# Patient Record
Sex: MALE | Race: WHITE | URBAN - METROPOLITAN AREA
[De-identification: names, ages, dates, MRNs, and addresses within clinical notes are randomized per-mention and may not be internally consistent; named-entity substitution may affect disease eponyms.]

---

## 2023-07-19 ENCOUNTER — EXTERNAL FACILITY (OUTPATIENT)
Dept: PULMONOLOGY | Facility: CLINIC | Age: 83
End: 2023-07-19

## 2023-07-19 DIAGNOSIS — R04.0 EPISTAXIS: ICD-10-CM

## 2023-07-19 DIAGNOSIS — J44.9 CHRONIC OBSTRUCTIVE PULMONARY DISEASE, UNSPECIFIED COPD TYPE (HCC): ICD-10-CM

## 2023-07-19 DIAGNOSIS — J96.11 CHRONIC HYPOXEMIC RESPIRATORY FAILURE (HCC): Primary | ICD-10-CM

## 2023-07-19 PROCEDURE — 99309 SBSQ NF CARE MODERATE MDM 30: CPT | Performed by: PHYSICIAN ASSISTANT

## 2023-07-19 NOTE — PROGRESS NOTES
Pulmonary Progress Note  [facility] [address]    HPI: Pt is an [de-identified] yo male who I am seeing for follow-up of respiratory failure and COPD. Seen and examined while resting in bed. C/o shortness of breath and chest tightness. He is asking for nebulizer treatment. Oxygen saturations 94% on 2 L O2. He had nose bleed overnight. He denies wheezing, chest pain, and cough. He denies fever and chills. It is of note that patient is confused at baseline. ROS: Unable to obtain. Patient is confused. PE:  /70, HR 77, RR 16, T 97.2, sat 94% on 2 L O2  General: Awake, alert, NAD, O2 via NC  HEENT: NC/AT, MMM, dried blood to bilateral nares  Cardio: RRR, S1S2, no murmur  Lungs: Normal effort, diminished BS b/l, expiratory rales  Abd: Soft, nontender, nondistended  Ext: No edema  Skin: Warm, dry  Neuro: A&Ox2 to person and year, moves all extremities  Psych Calm, cooperative, confused    A/P:  1. Chronic hypoxemic respiratory failure due to COPD with extensive tobacco history - oxygen saturations stable. He complains of worsening shortness of breath and has expiratory rales on exam. Will add scheduled nebs. Hold on steroids for now. Plan:  -O2 2-3 L  -Continue ICS/LABA (fluticasone-salmeterol)  -Add DuoNebs BID and q6 hours prn    2. Epistaxis - resolved. Plan:  -Add saline nasal spray  -Stop Flonase for now    3.  DVT prophylaxis    Plan:  -Dona Clayton PA-C  Pulmonary Medicine

## 2023-07-26 ENCOUNTER — EXTERNAL FACILITY (OUTPATIENT)
Dept: PULMONOLOGY | Facility: CLINIC | Age: 83
End: 2023-07-26

## 2023-07-26 DIAGNOSIS — J44.9 CHRONIC OBSTRUCTIVE PULMONARY DISEASE, UNSPECIFIED COPD TYPE (HCC): ICD-10-CM

## 2023-07-26 DIAGNOSIS — J96.11 CHRONIC HYPOXEMIC RESPIRATORY FAILURE (HCC): Primary | ICD-10-CM

## 2023-07-26 PROCEDURE — 99308 SBSQ NF CARE LOW MDM 20: CPT | Performed by: PHYSICIAN ASSISTANT

## 2023-07-28 NOTE — PROGRESS NOTES
Pulmonary Progress Note  [facility] [address]     HPI: Pt is an [de-identified] yo male who I am seeing for follow-up of respiratory failure and COPD. Seen and examined while resting in bed. He admits to chronic dyspnea which is stable. No chest tightness, wheezing, cough, or chest pain. \Patient is poor historian. ROS: Unable to obtain. Patient is confused. PE:  /66, HR 97, sat 96% on 2 L O2  General: Awake, alert, NAD, O2 via NC  HEENT: NC/AT, MMM  Cardio: RRR, S1S2, no murmur  Lungs: Normal effort, diminished BS b/l but otherwise CTAB  Ext: No edema  Skin: Warm, dry  Neuro: A&Ox2 to person and year, moves all extremities  Psych Calm, cooperative, confused     A/P:  1. Chronic hypoxemic respiratory failure due to COPD with extensive tobacco history - oxygen saturations stable. Better this week. Stable. Plan:  -O2 2-3 L  -Continue ICS/LABA (fluticasone-salmeterol)  -DuoNebs BID and q6 hours prn     2.  DVT prophylaxis     Plan:  -Dona Gordillo PA-C  Pulmonary Medicine